# Patient Record
Sex: MALE | Race: AMERICAN INDIAN OR ALASKA NATIVE | NOT HISPANIC OR LATINO | URBAN - METROPOLITAN AREA
[De-identification: names, ages, dates, MRNs, and addresses within clinical notes are randomized per-mention and may not be internally consistent; named-entity substitution may affect disease eponyms.]

---

## 2020-05-05 ENCOUNTER — EMERGENCY (EMERGENCY)
Facility: HOSPITAL | Age: 25
LOS: 1 days | Discharge: HOME | End: 2020-05-05
Attending: EMERGENCY MEDICINE | Admitting: EMERGENCY MEDICINE
Payer: MEDICAID

## 2020-05-05 VITALS
DIASTOLIC BLOOD PRESSURE: 86 MMHG | TEMPERATURE: 98 F | OXYGEN SATURATION: 99 % | SYSTOLIC BLOOD PRESSURE: 142 MMHG | HEART RATE: 88 BPM | RESPIRATION RATE: 18 BRPM

## 2020-05-05 PROCEDURE — 99284 EMERGENCY DEPT VISIT MOD MDM: CPT

## 2020-05-05 RX ORDER — FAMOTIDINE 10 MG/ML
40 INJECTION INTRAVENOUS ONCE
Refills: 0 | Status: DISCONTINUED | OUTPATIENT
Start: 2020-05-05 | End: 2020-05-05

## 2020-05-05 RX ORDER — CLINDAMYCIN PHOSPHATE GEL USP, 1% 10 MG/G
1 GEL TOPICAL
Qty: 1 | Refills: 0
Start: 2020-05-05 | End: 2020-05-14

## 2020-05-05 RX ORDER — FAMOTIDINE 10 MG/ML
20 INJECTION INTRAVENOUS ONCE
Refills: 0 | Status: COMPLETED | OUTPATIENT
Start: 2020-05-05 | End: 2020-05-05

## 2020-05-05 RX ORDER — DIPHENHYDRAMINE HCL 50 MG
50 CAPSULE ORAL ONCE
Refills: 0 | Status: COMPLETED | OUTPATIENT
Start: 2020-05-05 | End: 2020-05-05

## 2020-05-05 RX ORDER — DIPHENHYDRAMINE HCL 50 MG
1 CAPSULE ORAL
Qty: 30 | Refills: 0
Start: 2020-05-05

## 2020-05-05 RX ADMIN — FAMOTIDINE 20 MILLIGRAM(S): 10 INJECTION INTRAVENOUS at 10:44

## 2020-05-05 RX ADMIN — Medication 50 MILLIGRAM(S): at 10:44

## 2020-05-05 NOTE — ED PROVIDER NOTE - PHYSICAL EXAMINATION
Gen: AAOx3, non-toxic  Head: Scalp ttp, with erythema and pustules   HEENT: EOMI, oral mucosa moist, normal conjunctiva, airway patent and intact, -edema or change in voice  Lung: CTAB, no respiratory distress, no wheezes/rhonchi/rales B/L, speaking in full sentences  CV: RRR, no murmurs, rubs or gallops  Abd: soft, NTND, no guarding  MSK: no visible deformities  Neuro: No focal sensory or motor deficits  Skin: Warm, well perfused, diffuse pustules on scalp, face, hands b/l and upper back  Psych: normal affect.   ~Ridge Corrales M.D. Resident

## 2020-05-05 NOTE — ED PROVIDER NOTE - OBJECTIVE STATEMENT
24yM with no significant pmh presents with head itching, rash and tingling s/p mustard oil in scalp 4 days prior. Symptoms started a few hours after application but presented today due to worsening symptoms with rash spreading on hands. No fever, chest pain, abd pain, nausea, vomiting, urinary or bowel irregularities.

## 2020-05-05 NOTE — ED PROVIDER NOTE - NS ED ROS FT
GENERAL: +Scalp itchingm No fever or chills, EYES: no change in vision, HEENT: no trouble swallowing or speaking, CARDIAC: no chest pain, PULMONARY: no cough or SOB, GI: no abdominal pain, no nausea, no vomiting, no diarrhea or constipation, : No changes in urination, SKIN: +rashes, NEURO: no headache,  MSK: No joint pain ~Ridge Corrales M.D. Resident

## 2020-05-05 NOTE — ED PROVIDER NOTE - PROGRESS NOTE DETAILS
Dr. Marks: Pt notes improvement in itchiness. Sent topical Clinda, Prednisone, and Benadryl to pharmacy. Given Derm f/u.

## 2020-05-05 NOTE — ED PROVIDER NOTE - PATIENT PORTAL LINK FT
You can access the FollowMyHealth Patient Portal offered by [facility] by registering at the following website: [email or web address]. By joining [facility]’s FollowMyHealth portal, you will also be able to view your health information using other applications (apps) compatible with our system.

## 2020-05-05 NOTE — ED PROVIDER NOTE - CLINICAL SUMMARY MEDICAL DECISION MAKING FREE TEXT BOX
24yM with no significant pmh presents with head itching, rash and tingling s/p mustard oil in scalp 4 days prior. Airway intact and low index for anaphylaxis. Concern for allergic reaction vs folliculitis. Will give benadryl, pepcid, steroids and topical clindamycin.

## 2020-05-05 NOTE — ED PROVIDER NOTE - ATTENDING CONTRIBUTION TO CARE
Pt was seen and evaluated by me. Pt is a 23 y/o male with no significant PMHx who presented to the ED for itchiness and rash to posterior scalp X 4 days. Pt states 4 days ago he applied mustard oil to his scalp. Pt states since having increased redness and itchiness to the area. Pt notes today having itchiness to his hands. Pt denies any difficulty swallowing or SOB. Pt denies any fever, chills, nausea, vomiting, chest pain, or abd pain. Lungs CTA b/l. RRR. Abd soft, non-tender. Noted to have erythema with pustules and base of hair consistent with folliculitis.   Concern for folliculitis/allergic reaction  Medication, Derm f/u

## 2020-05-05 NOTE — ED PROVIDER NOTE - NSFOLLOWUPINSTRUCTIONS_ED_ALL_ED_FT
No signs of emergency medical condition on today's workup.  Presumptive diagnosis made, but further evaluation may be required by your primary care doctor or specialist for a definitive diagnosis.    Please follow up with your primary care physician in 24-48 hours  A referral to Dermatology have been provided to you  You have been prescribed Benadryl, pepcid, prednisone and topical Clindamycin: Please take as instructed  Please come back if any of the following: Fever, chest pain, shortness of breath, lip swelling, worsening symptoms or any major concern